# Patient Record
Sex: FEMALE | URBAN - METROPOLITAN AREA
[De-identification: names, ages, dates, MRNs, and addresses within clinical notes are randomized per-mention and may not be internally consistent; named-entity substitution may affect disease eponyms.]

---

## 2024-01-02 ENCOUNTER — OFFICE VISIT (OUTPATIENT)
Dept: URBAN - METROPOLITAN AREA CLINIC 17 | Facility: CLINIC | Age: 44
End: 2024-01-02

## 2024-01-10 ENCOUNTER — LAB OUTSIDE AN ENCOUNTER (OUTPATIENT)
Dept: URBAN - METROPOLITAN AREA CLINIC 17 | Facility: CLINIC | Age: 44
End: 2024-01-10

## 2024-01-10 ENCOUNTER — OFFICE VISIT (OUTPATIENT)
Dept: URBAN - METROPOLITAN AREA CLINIC 17 | Facility: CLINIC | Age: 44
End: 2024-01-10
Payer: OTHER GOVERNMENT

## 2024-01-10 VITALS
HEIGHT: 67 IN | SYSTOLIC BLOOD PRESSURE: 88 MMHG | BODY MASS INDEX: 35.47 KG/M2 | DIASTOLIC BLOOD PRESSURE: 63 MMHG | TEMPERATURE: 97.3 F | WEIGHT: 226 LBS | HEART RATE: 73 BPM

## 2024-01-10 DIAGNOSIS — K58.1 IRRITABLE BOWEL SYNDROME WITH CONSTIPATION: ICD-10-CM

## 2024-01-10 DIAGNOSIS — I10 PRIMARY HYPERTENSION: ICD-10-CM

## 2024-01-10 DIAGNOSIS — K62.89 RECTAL PAIN: ICD-10-CM

## 2024-01-10 DIAGNOSIS — K62.5 RECTAL BLEEDING: ICD-10-CM

## 2024-01-10 PROBLEM — 77880009: Status: ACTIVE | Noted: 2024-01-10

## 2024-01-10 PROBLEM — 59621000: Status: ACTIVE | Noted: 2024-01-10

## 2024-01-10 PROBLEM — 55822004: Status: ACTIVE | Noted: 2024-01-10

## 2024-01-10 PROBLEM — 82934008: Status: ACTIVE | Noted: 2024-01-10

## 2024-01-10 PROBLEM — 440630006: Status: ACTIVE | Noted: 2024-01-10

## 2024-01-10 PROBLEM — 398022005: Status: ACTIVE | Noted: 2024-01-10

## 2024-01-10 PROCEDURE — 99204 OFFICE O/P NEW MOD 45 MIN: CPT | Performed by: INTERNAL MEDICINE

## 2024-01-10 PROCEDURE — 99244 OFF/OP CNSLTJ NEW/EST MOD 40: CPT | Performed by: INTERNAL MEDICINE

## 2024-01-10 RX ORDER — TOPIRAMATE 200 MG/1
TABLET ORAL
Qty: 360 TABLET | Status: ACTIVE | COMMUNITY

## 2024-01-10 RX ORDER — NALTREXONE HYDROCHLORIDE 50 MG/1
TABLET, FILM COATED ORAL
Qty: 60 TABLET | Status: ACTIVE | COMMUNITY

## 2024-01-10 RX ORDER — GABAPENTIN 300 MG/1
1 CAPSULE CAPSULE ORAL ONCE A DAY
Status: ACTIVE | COMMUNITY

## 2024-01-10 RX ORDER — CHLORHEXIDINE GLUCONATE 1.2 MG/ML
RINSE ORAL
Qty: 473 UNSPECIFIED | Refills: 0 | Status: ACTIVE | COMMUNITY

## 2024-01-10 RX ORDER — AMLODIPINE BESYLATE 10 MG/1
TABLET ORAL
Qty: 90 TABLET | Status: ACTIVE | COMMUNITY

## 2024-01-10 RX ORDER — VALSARTAN 320 MG/1
TABLET ORAL
Qty: 90 TABLET | Status: ACTIVE | COMMUNITY

## 2024-01-10 RX ORDER — ERGOCALCIFEROL 1.25 MG/1
CAPSULE, LIQUID FILLED ORAL
Qty: 13 CAPSULE | Status: ACTIVE | COMMUNITY

## 2024-01-10 RX ORDER — SPIRONOLACTONE 50 MG/1
TABLET, FILM COATED ORAL
Qty: 90 TABLET | Status: ACTIVE | COMMUNITY

## 2024-01-10 RX ORDER — POLYETHYLENE GLYCOL-3350 AND ELECTROLYTES 236; 6.74; 5.86; 2.97; 22.74 G/274.31G; G/274.31G; G/274.31G; G/274.31G; G/274.31G
4000ML POWDER, FOR SOLUTION ORAL 1
Qty: 1 | Refills: 0 | OUTPATIENT
Start: 2024-01-10 | End: 2024-01-11

## 2024-01-10 RX ORDER — LISDEXAMFETAMINE DIMESYLATE 50 MG/1
CAPSULE ORAL
Qty: 30 CAPSULE | Status: ACTIVE | COMMUNITY

## 2024-01-10 RX ORDER — VALACYCLOVIR 500 MG/1
1 TABLET TABLET, FILM COATED ORAL ONCE A DAY
Status: ACTIVE | COMMUNITY

## 2024-01-10 RX ORDER — HYDROXYZINE HYDROCHLORIDE 25 MG/1
1 TABLET AS NEEDED TABLET, FILM COATED ORAL ONCE A DAY
Status: ACTIVE | COMMUNITY

## 2024-01-10 RX ORDER — TRAZODONE HYDROCHLORIDE 50 MG/1
1 TABLET AT BEDTIME AS NEEDED TABLET ORAL ONCE A DAY
Status: ACTIVE | COMMUNITY

## 2024-01-10 RX ORDER — PREDNISONE 20 MG/1
TAKE ONE TABLET BY MOUTH ONE TIME DAILY WITH A MEAL TABLET ORAL
Qty: 7 UNSPECIFIED | Refills: 0 | Status: ACTIVE | COMMUNITY

## 2024-01-10 RX ORDER — HYDROCORTISONE 25 MG/G
1 APPLICATION CREAM TOPICAL ONCE A DAY
Qty: 1 APPLICATOR | Refills: 0 | OUTPATIENT
Start: 2024-01-10 | End: 2024-01-20

## 2024-01-10 RX ORDER — LISDEXAMFETAMINE DIMESYLATE 70 MG/1
TAKE ONE CAPSULE BY MOUTH EVERY MORNING CAPSULE ORAL
Qty: 30 UNSPECIFIED | Refills: 0 | Status: ACTIVE | COMMUNITY

## 2024-01-10 RX ORDER — LINACLOTIDE 72 UG/1
1 CAPSULE AT LEAST 30 MINUTES BEFORE THE FIRST MEAL OF THE DAY ON AN EMPTY STOMACH CAPSULE, GELATIN COATED ORAL ONCE A DAY
Qty: 30 | Refills: 1 | OUTPATIENT
Start: 2024-01-10 | End: 2024-03-10

## 2024-01-10 NOTE — HPI-TODAY'S VISIT:
New patient 42 yo female with PMH of PTSD, PCOS, urinary incontinence, vaginal prolapse and HTN [facility].  Reports rectal bleeding in stool and TP, rectal pain with BMs. Both symptoms are intermittent and have been prominent for years.  Admits to hx of constipation, she states she used to  "go weeks without it". She had an abdominal KUB done years ago when she was informed by a provider in the [employer] that she was "full of stool". She states she has tried Miralax in the past with some relief but not completely. She will have associated abdominal pain and bloating relieved with BMs. Last BM was this morning with incomplete evacuation. She states her last BM was this morning.  She states her aunt had a colectomy, but unsure why. Denies other known family hx of colon cancer and colon polyps.  She has never had a colonoscopy before.   She has a hx of pelvic floor dysfunction where she had several Botox injections in her bladder. She is currently in physical therapy for it.

## 2024-01-10 NOTE — PHYSICAL EXAM GASTROINTESTINAL
Abdomen , soft, nontender, nondistended , Rectal , normal sphincter tone , small to moderate internal hemorrhoids, no rectal masses or bleeding present, slight tenderness with palpation of anterior aspect of rectum

## 2024-01-10 NOTE — PHYSICAL EXAM HENT:
Head, normocephalic, atraumatic, Face, Face within normal limits, Ears, External ears within normal limits ,  Nose/Nasopharynx,  External nose  normal appearance,  nares patent,  no nasal discharge,  Mouth and Throat,  Oral cavity appearance normal,  Breath odor normal,  Lips,  Appearance normal

## 2024-04-09 ENCOUNTER — COLON (OUTPATIENT)
Dept: URBAN - METROPOLITAN AREA SURGERY CENTER 16 | Facility: SURGERY CENTER | Age: 44
End: 2024-04-09
Payer: OTHER GOVERNMENT

## 2024-04-09 DIAGNOSIS — Z12.11 COLON CANCER SCREENING: ICD-10-CM

## 2024-04-09 PROCEDURE — G0121 COLON CA SCRN NOT HI RSK IND: HCPCS | Performed by: INTERNAL MEDICINE

## 2024-04-09 RX ORDER — NALTREXONE HYDROCHLORIDE 50 MG/1
TABLET, FILM COATED ORAL
Qty: 60 TABLET | Status: ACTIVE | COMMUNITY

## 2024-04-09 RX ORDER — SPIRONOLACTONE 50 MG/1
TABLET, FILM COATED ORAL
Qty: 90 TABLET | Status: ACTIVE | COMMUNITY

## 2024-04-09 RX ORDER — LISDEXAMFETAMINE DIMESYLATE 50 MG/1
CAPSULE ORAL
Qty: 30 CAPSULE | Status: ACTIVE | COMMUNITY

## 2024-04-09 RX ORDER — AMLODIPINE BESYLATE 10 MG/1
TABLET ORAL
Qty: 90 TABLET | Status: ACTIVE | COMMUNITY

## 2024-04-09 RX ORDER — VALSARTAN 320 MG/1
TABLET ORAL
Qty: 90 TABLET | Status: ACTIVE | COMMUNITY

## 2024-04-09 RX ORDER — VALACYCLOVIR 500 MG/1
1 TABLET TABLET, FILM COATED ORAL ONCE A DAY
Status: ACTIVE | COMMUNITY

## 2024-04-09 RX ORDER — CHLORHEXIDINE GLUCONATE 1.2 MG/ML
RINSE ORAL
Qty: 473 UNSPECIFIED | Refills: 0 | Status: ACTIVE | COMMUNITY

## 2024-04-09 RX ORDER — HYDROXYZINE HYDROCHLORIDE 25 MG/1
1 TABLET AS NEEDED TABLET, FILM COATED ORAL ONCE A DAY
Status: ACTIVE | COMMUNITY

## 2024-04-09 RX ORDER — LISDEXAMFETAMINE DIMESYLATE 70 MG/1
TAKE ONE CAPSULE BY MOUTH EVERY MORNING CAPSULE ORAL
Qty: 30 UNSPECIFIED | Refills: 0 | Status: ACTIVE | COMMUNITY

## 2024-04-09 RX ORDER — ERGOCALCIFEROL 1.25 MG/1
CAPSULE, LIQUID FILLED ORAL
Qty: 13 CAPSULE | Status: ACTIVE | COMMUNITY

## 2024-04-09 RX ORDER — TRAZODONE HYDROCHLORIDE 50 MG/1
1 TABLET AT BEDTIME AS NEEDED TABLET ORAL ONCE A DAY
Status: ACTIVE | COMMUNITY

## 2024-04-09 RX ORDER — GABAPENTIN 300 MG/1
1 CAPSULE CAPSULE ORAL ONCE A DAY
Status: ACTIVE | COMMUNITY

## 2024-04-09 RX ORDER — TOPIRAMATE 200 MG/1
TABLET ORAL
Qty: 360 TABLET | Status: ACTIVE | COMMUNITY

## 2024-04-09 RX ORDER — PREDNISONE 20 MG/1
TAKE ONE TABLET BY MOUTH ONE TIME DAILY WITH A MEAL TABLET ORAL
Qty: 7 UNSPECIFIED | Refills: 0 | Status: ACTIVE | COMMUNITY

## 2024-05-06 ENCOUNTER — OFFICE VISIT (OUTPATIENT)
Dept: URBAN - METROPOLITAN AREA CLINIC 105 | Facility: CLINIC | Age: 44
End: 2024-05-06
Payer: OTHER GOVERNMENT

## 2024-05-06 ENCOUNTER — DASHBOARD ENCOUNTERS (OUTPATIENT)
Age: 44
End: 2024-05-06

## 2024-05-06 VITALS
SYSTOLIC BLOOD PRESSURE: 108 MMHG | DIASTOLIC BLOOD PRESSURE: 80 MMHG | TEMPERATURE: 97.5 F | WEIGHT: 225.2 LBS | BODY MASS INDEX: 35.35 KG/M2 | HEART RATE: 99 BPM | HEIGHT: 67 IN

## 2024-05-06 DIAGNOSIS — K57.30 ACQUIRED DIVERTICULOSIS OF COLON: ICD-10-CM

## 2024-05-06 DIAGNOSIS — M62.89 PELVIC FLOOR DYSFUNCTION: ICD-10-CM

## 2024-05-06 DIAGNOSIS — K58.1 IRRITABLE BOWEL SYNDROME WITH CONSTIPATION: ICD-10-CM

## 2024-05-06 DIAGNOSIS — K62.89 RECTAL PAIN: ICD-10-CM

## 2024-05-06 PROBLEM — 397881000: Status: ACTIVE | Noted: 2024-05-06

## 2024-05-06 PROCEDURE — 99214 OFFICE O/P EST MOD 30 MIN: CPT | Performed by: INTERNAL MEDICINE

## 2024-05-06 RX ORDER — LINACLOTIDE 72 UG/1
1 CAPSULE AT LEAST 30 MINUTES BEFORE THE FIRST MEAL OF THE DAY ON AN EMPTY STOMACH CAPSULE, GELATIN COATED ORAL ONCE A DAY
Qty: 90 | Refills: 0 | OUTPATIENT
Start: 2024-05-06 | End: 2024-08-04

## 2024-05-06 RX ORDER — TOPIRAMATE 200 MG/1
TABLET ORAL
Qty: 360 TABLET | Status: ACTIVE | COMMUNITY

## 2024-05-06 RX ORDER — VALACYCLOVIR 500 MG/1
1 TABLET TABLET, FILM COATED ORAL ONCE A DAY
Status: ACTIVE | COMMUNITY

## 2024-05-06 RX ORDER — CHLORHEXIDINE GLUCONATE 1.2 MG/ML
RINSE ORAL
Qty: 473 UNSPECIFIED | Refills: 0 | Status: ACTIVE | COMMUNITY

## 2024-05-06 RX ORDER — METHOCARBAMOL 500 MG/1
1.5 TABLETS TABLET ORAL
Status: ACTIVE | COMMUNITY

## 2024-05-06 RX ORDER — HYDROXYZINE HYDROCHLORIDE 25 MG/1
1 TABLET AS NEEDED TABLET, FILM COATED ORAL ONCE A DAY
Status: ACTIVE | COMMUNITY

## 2024-05-06 RX ORDER — HYDROCORTISONE 10 MG/G
1 APPLICATION CREAM TOPICAL ONCE A DAY
Status: ACTIVE | COMMUNITY

## 2024-05-06 RX ORDER — GABAPENTIN 300 MG/1
1 CAPSULE CAPSULE ORAL ONCE A DAY
Status: ACTIVE | COMMUNITY

## 2024-05-06 RX ORDER — ERGOCALCIFEROL 1.25 MG/1
CAPSULE, LIQUID FILLED ORAL
Qty: 13 CAPSULE | Status: ACTIVE | COMMUNITY

## 2024-05-06 RX ORDER — LISDEXAMFETAMINE DIMESYLATE 50 MG/1
CAPSULE ORAL
Qty: 30 CAPSULE | Status: ACTIVE | COMMUNITY

## 2024-05-06 RX ORDER — SPIRONOLACTONE 50 MG/1
TABLET, FILM COATED ORAL
Qty: 90 TABLET | Status: ACTIVE | COMMUNITY

## 2024-05-06 RX ORDER — NALTREXONE HYDROCHLORIDE 50 MG/1
TABLET, FILM COATED ORAL
Qty: 60 TABLET | Status: ACTIVE | COMMUNITY

## 2024-05-06 RX ORDER — TRAZODONE HYDROCHLORIDE 50 MG/1
1 TABLET AT BEDTIME AS NEEDED TABLET ORAL ONCE A DAY
Status: ACTIVE | COMMUNITY

## 2024-05-06 RX ORDER — VALSARTAN 320 MG/1
TABLET ORAL
Qty: 90 TABLET | Status: ACTIVE | COMMUNITY

## 2024-05-06 RX ORDER — AMLODIPINE BESYLATE 10 MG/1
TABLET ORAL
Qty: 90 TABLET | Status: ACTIVE | COMMUNITY

## 2024-07-09 ENCOUNTER — OFFICE VISIT (OUTPATIENT)
Dept: URBAN - METROPOLITAN AREA CLINIC 105 | Facility: CLINIC | Age: 44
End: 2024-07-09

## 2024-07-09 RX ORDER — HYDROXYZINE HYDROCHLORIDE 25 MG/1
1 TABLET AS NEEDED TABLET, FILM COATED ORAL ONCE A DAY
Status: ACTIVE | COMMUNITY

## 2024-07-09 RX ORDER — TRAZODONE HYDROCHLORIDE 50 MG/1
1 TABLET AT BEDTIME AS NEEDED TABLET ORAL ONCE A DAY
Status: ACTIVE | COMMUNITY

## 2024-07-09 RX ORDER — ERGOCALCIFEROL 1.25 MG/1
CAPSULE, LIQUID FILLED ORAL
Qty: 13 CAPSULE | Status: ACTIVE | COMMUNITY

## 2024-07-09 RX ORDER — CHLORHEXIDINE GLUCONATE 1.2 MG/ML
RINSE ORAL
Qty: 473 UNSPECIFIED | Refills: 0 | Status: ACTIVE | COMMUNITY

## 2024-07-09 RX ORDER — METHOCARBAMOL 500 MG/1
1.5 TABLETS TABLET ORAL
Status: ACTIVE | COMMUNITY

## 2024-07-09 RX ORDER — SPIRONOLACTONE 50 MG/1
TABLET, FILM COATED ORAL
Qty: 90 TABLET | Status: ACTIVE | COMMUNITY

## 2024-07-09 RX ORDER — VALSARTAN 320 MG/1
TABLET ORAL
Qty: 90 TABLET | Status: ACTIVE | COMMUNITY

## 2024-07-09 RX ORDER — AMLODIPINE BESYLATE 10 MG/1
TABLET ORAL
Qty: 90 TABLET | Status: ACTIVE | COMMUNITY

## 2024-07-09 RX ORDER — LISDEXAMFETAMINE DIMESYLATE 50 MG/1
CAPSULE ORAL
Qty: 30 CAPSULE | Status: ACTIVE | COMMUNITY

## 2024-07-09 RX ORDER — NALTREXONE HYDROCHLORIDE 50 MG/1
TABLET, FILM COATED ORAL
Qty: 60 TABLET | Status: ACTIVE | COMMUNITY

## 2024-07-09 RX ORDER — HYDROCORTISONE 10 MG/G
1 APPLICATION CREAM TOPICAL ONCE A DAY
Status: ACTIVE | COMMUNITY

## 2024-07-09 RX ORDER — LINACLOTIDE 72 UG/1
1 CAPSULE AT LEAST 30 MINUTES BEFORE THE FIRST MEAL OF THE DAY ON AN EMPTY STOMACH CAPSULE, GELATIN COATED ORAL ONCE A DAY
Qty: 90 | Refills: 0 | Status: ACTIVE | COMMUNITY
Start: 2024-05-06 | End: 2024-08-04

## 2024-07-09 RX ORDER — GABAPENTIN 300 MG/1
1 CAPSULE CAPSULE ORAL ONCE A DAY
Status: ACTIVE | COMMUNITY

## 2024-07-09 RX ORDER — VALACYCLOVIR 500 MG/1
1 TABLET TABLET, FILM COATED ORAL ONCE A DAY
Status: ACTIVE | COMMUNITY

## 2024-07-09 RX ORDER — TOPIRAMATE 200 MG/1
TABLET ORAL
Qty: 360 TABLET | Status: ACTIVE | COMMUNITY

## 2024-08-07 ENCOUNTER — OFFICE VISIT (OUTPATIENT)
Dept: URBAN - METROPOLITAN AREA CLINIC 105 | Facility: CLINIC | Age: 44
End: 2024-08-07

## 2024-08-07 RX ORDER — AMLODIPINE BESYLATE 10 MG/1
TABLET ORAL
Qty: 90 TABLET | COMMUNITY

## 2024-08-07 RX ORDER — VALACYCLOVIR 500 MG/1
1 TABLET TABLET, FILM COATED ORAL ONCE A DAY
COMMUNITY

## 2024-08-07 RX ORDER — CHLORHEXIDINE GLUCONATE 1.2 MG/ML
RINSE ORAL
Qty: 473 UNSPECIFIED | Refills: 0 | COMMUNITY

## 2024-08-07 RX ORDER — ERGOCALCIFEROL 1.25 MG/1
CAPSULE, LIQUID FILLED ORAL
Qty: 13 CAPSULE | COMMUNITY

## 2024-08-07 RX ORDER — METHOCARBAMOL 500 MG/1
1.5 TABLETS TABLET ORAL
COMMUNITY

## 2024-08-07 RX ORDER — TOPIRAMATE 200 MG/1
TABLET ORAL
Qty: 360 TABLET | COMMUNITY

## 2024-08-07 RX ORDER — HYDROXYZINE HYDROCHLORIDE 25 MG/1
1 TABLET AS NEEDED TABLET, FILM COATED ORAL ONCE A DAY
COMMUNITY

## 2024-08-07 RX ORDER — TRAZODONE HYDROCHLORIDE 50 MG/1
1 TABLET AT BEDTIME AS NEEDED TABLET ORAL ONCE A DAY
COMMUNITY

## 2024-08-07 RX ORDER — LISDEXAMFETAMINE DIMESYLATE 50 MG/1
CAPSULE ORAL
Qty: 30 CAPSULE | COMMUNITY

## 2024-08-07 RX ORDER — HYDROCORTISONE 10 MG/G
1 APPLICATION CREAM TOPICAL ONCE A DAY
COMMUNITY

## 2024-08-07 RX ORDER — VALSARTAN 320 MG/1
TABLET ORAL
Qty: 90 TABLET | COMMUNITY

## 2024-08-07 RX ORDER — SPIRONOLACTONE 50 MG/1
TABLET, FILM COATED ORAL
Qty: 90 TABLET | COMMUNITY

## 2024-08-07 RX ORDER — NALTREXONE HYDROCHLORIDE 50 MG/1
TABLET, FILM COATED ORAL
Qty: 60 TABLET | COMMUNITY

## 2024-08-07 RX ORDER — GABAPENTIN 300 MG/1
1 CAPSULE CAPSULE ORAL ONCE A DAY
COMMUNITY

## 2024-08-07 NOTE — HPI-TODAY'S VISIT:
1/10/2024 New patient 44 yo female with PMH of PTSD, PCOS, urinary incontinence, vaginal prolapse and HTN [facility].  Reports rectal bleeding in stool and TP, rectal pain with BMs. Both symptoms are intermittent and have been prominent for years.  Admits to hx of constipation, she states she used to  "go weeks without it". She had an abdominal KUB done years ago when she was informed by a provider in the [employer] that she was "full of stool". She states she has tried Miralax in the past with some relief but not completely. She will have associated abdominal pain and bloating relieved with BMs. Last BM was this morning with incomplete evacuation. She states her last BM was this morning.  She states her aunt had a colectomy, but unsure why. Denies other known family hx of colon cancer and colon polyps.  She has never had a colonoscopy before.  She has a hx of pelvic floor dysfunction where she had several Botox injections in her bladder. She is currently in physical therapy for it.  5/6/2024 Pt presents for f/u after colonoscopy. At last visit, she was prescribed Linzess 72 mcg, but she states she never got it. She notes she had severe constipation after the colonoscopy; when she had a BM 3 days later, it was a big and hard stool. She is drinking water and tries to stay active, but otherwise not doing a bowel regimen. She would like Linzess sent to [facility]. Labs 1/2024: CBC normal. TIBC and transferrin low, iron and ferritin normal. AST and ALT normal.  8/7/2024 Pt presents for f/u. At last visit, Linzess 72 mcg and nifedipine 0.2% ointment sent to [facility].  Emily